# Patient Record
Sex: MALE | Race: WHITE | NOT HISPANIC OR LATINO | Employment: UNEMPLOYED | URBAN - METROPOLITAN AREA
[De-identification: names, ages, dates, MRNs, and addresses within clinical notes are randomized per-mention and may not be internally consistent; named-entity substitution may affect disease eponyms.]

---

## 2024-08-12 PROBLEM — I48.20 CHRONIC ATRIAL FIBRILLATION: Chronic | Status: ACTIVE | Noted: 2024-08-12

## 2024-08-12 PROBLEM — I10 PRIMARY HYPERTENSION: Chronic | Status: ACTIVE | Noted: 2024-08-12

## 2024-08-12 PROBLEM — Z72.0 TOBACCO ABUSE: Chronic | Status: ACTIVE | Noted: 2024-08-12

## 2024-08-12 PROBLEM — K21.9 GASTROESOPHAGEAL REFLUX DISEASE WITHOUT ESOPHAGITIS: Chronic | Status: ACTIVE | Noted: 2024-08-12

## 2024-08-14 RX ORDER — SODIUM, POTASSIUM,MAG SULFATES 17.5-3.13G
1 SOLUTION, RECONSTITUTED, ORAL ORAL DAILY
Qty: 1 KIT | Refills: 0 | Status: SHIPPED | OUTPATIENT
Start: 2024-08-14 | End: 2024-08-16

## 2024-08-29 ENCOUNTER — TELEPHONE (OUTPATIENT)
Dept: GASTROENTEROLOGY | Facility: CLINIC | Age: 54
End: 2024-08-29

## 2024-08-29 NOTE — TELEPHONE ENCOUNTER
I called the patient and cancelled his colonoscopy on 9/4/2024 because he needs cardiac clearance. He has appt with Tete Araiza on 9/3/2024 but me need to see his cardiologist to get that clearance.

## 2024-09-03 PROBLEM — R00.0 TACHYCARDIA: Chronic | Status: ACTIVE | Noted: 2024-09-03

## 2024-11-14 ENCOUNTER — OFFICE VISIT (OUTPATIENT)
Dept: CARDIOLOGY | Facility: CLINIC | Age: 54
End: 2024-11-14
Payer: MEDICAID

## 2024-11-14 VITALS
WEIGHT: 250.31 LBS | HEART RATE: 120 BPM | DIASTOLIC BLOOD PRESSURE: 98 MMHG | BODY MASS INDEX: 33.9 KG/M2 | OXYGEN SATURATION: 98 % | SYSTOLIC BLOOD PRESSURE: 152 MMHG | HEIGHT: 72 IN

## 2024-11-14 DIAGNOSIS — R25.2 BILATERAL LEG CRAMPS: ICD-10-CM

## 2024-11-14 DIAGNOSIS — I73.9 PERIPHERAL VASCULAR DISEASE OF LOWER EXTREMITY: ICD-10-CM

## 2024-11-14 DIAGNOSIS — E78.5 HYPERLIPIDEMIA, UNSPECIFIED HYPERLIPIDEMIA TYPE: ICD-10-CM

## 2024-11-14 DIAGNOSIS — I10 PRIMARY HYPERTENSION: Chronic | ICD-10-CM

## 2024-11-14 DIAGNOSIS — R60.0 BILATERAL LEG EDEMA: Primary | ICD-10-CM

## 2024-11-14 DIAGNOSIS — I48.20 CHRONIC ATRIAL FIBRILLATION: Chronic | ICD-10-CM

## 2024-11-14 DIAGNOSIS — R00.0 TACHYCARDIA: Chronic | ICD-10-CM

## 2024-11-14 DIAGNOSIS — I83.813 VARICOSE VEINS OF BOTH LOWER EXTREMITIES WITH PAIN: ICD-10-CM

## 2024-11-14 PROCEDURE — G2211 COMPLEX E/M VISIT ADD ON: HCPCS | Mod: S$PBB,,,

## 2024-11-14 PROCEDURE — 3008F BODY MASS INDEX DOCD: CPT | Mod: CPTII,,,

## 2024-11-14 PROCEDURE — 3077F SYST BP >= 140 MM HG: CPT | Mod: CPTII,,,

## 2024-11-14 PROCEDURE — 1160F RVW MEDS BY RX/DR IN RCRD: CPT | Mod: CPTII,,,

## 2024-11-14 PROCEDURE — 99204 OFFICE O/P NEW MOD 45 MIN: CPT | Mod: S$PBB,,,

## 2024-11-14 PROCEDURE — 1159F MED LIST DOCD IN RCRD: CPT | Mod: CPTII,,,

## 2024-11-14 PROCEDURE — 99215 OFFICE O/P EST HI 40 MIN: CPT | Mod: PBBFAC,PN

## 2024-11-14 PROCEDURE — 99999 PR PBB SHADOW E&M-EST. PATIENT-LVL V: CPT | Mod: PBBFAC,,,

## 2024-11-14 PROCEDURE — 4010F ACE/ARB THERAPY RXD/TAKEN: CPT | Mod: CPTII,,,

## 2024-11-14 PROCEDURE — 3080F DIAST BP >= 90 MM HG: CPT | Mod: CPTII,,,

## 2024-11-14 PROCEDURE — 3044F HG A1C LEVEL LT 7.0%: CPT | Mod: CPTII,,,

## 2024-11-14 RX ORDER — LOSARTAN POTASSIUM 50 MG/1
100 TABLET ORAL DAILY
Qty: 180 TABLET | Refills: 3 | Status: SHIPPED | OUTPATIENT
Start: 2024-11-14 | End: 2025-11-14

## 2024-11-14 RX ORDER — APIXABAN 5 MG/1
5 TABLET, FILM COATED ORAL 2 TIMES DAILY
COMMUNITY
Start: 2024-11-04

## 2024-11-14 RX ORDER — METOPROLOL SUCCINATE 50 MG/1
100 TABLET, EXTENDED RELEASE ORAL DAILY
Qty: 180 TABLET | Refills: 3 | Status: SHIPPED | OUTPATIENT
Start: 2024-11-14 | End: 2025-11-14

## 2024-11-14 RX ORDER — ATORVASTATIN CALCIUM 40 MG/1
40 TABLET, FILM COATED ORAL DAILY
Qty: 90 TABLET | Refills: 3 | Status: SHIPPED | OUTPATIENT
Start: 2024-11-14 | End: 2025-11-14

## 2024-11-14 NOTE — PROGRESS NOTES
[facility] - [facility] [address]  Cardiology Clinic Note      Chief Complaint  Chief Complaint   Patient presents with    Peripheral Vascular Disease    Numbness     Left thigh       HPI:  Mr. Kessler is a 54-year-old male with a past medical history of tobacco dependency, GERD, tachycardia, history of right lung pulmonary embolism 5/2023, chronic atrial fibrillation TTE with cardioversion 05/2023, hypertension; echo 09/2024 EF 60-65% with normal diastolic and systolic function normal CVP    Patient is new to me and here to establish care  Referred by PCP for concerns with varicose veins and numbness  Presents with bilateral varicose veins he reports has been causing some discomfort  He also complains bilateral lower extremity edema and leg cramps  He does endorse tobacco use of about a half a pack per day  He does have a history of a left lower extremity DVT and chronic cellulitis  Reports that he has never been seen by electrophysiology for chronic AFib  Denies chest pain, SOB, or difficulty breathing  Denies palpitations, lightheadedness, dizziness, or syncope/presyncope  Denies cough, LE edema, orthopnea, or PND  Denies falls or head injures  Denies easy bruising, hematochezia, or hemoptysis  Denies fever, chills, or NVD  Denies any recent travels or close contact with sick individuals  He is independent and active without any debilities    EKG normal sinus rhythm heart rate 80s    Medications  Current Outpatient Medications   Medication Sig Dispense Refill    ELIQUIS 5 mg Tab Take 5 mg by mouth 2 (two) times daily.      pantoprazole (PROTONIX) 40 MG tablet Take 1 tablet (40 mg total) by mouth once daily. 90 tablet 3    albuterol (PROAIR HFA) 90 mcg/actuation inhaler Inhale 2 puffs into the lungs every 6 (six) hours as needed for Wheezing. Rescue 18 g 0    ammonium lactate (LAC-HYDRIN) 12 % lotion Apply topically 2 (two) times daily. 396 g 3    atorvastatin (LIPITOR) 40 MG tablet Take 1 tablet (40 mg total) by  mouth once daily. 90 tablet 3    losartan (COZAAR) 50 MG tablet Take 2 tablets (100 mg total) by mouth once daily. 180 tablet 3    metoprolol succinate (TOPROL-XL) 50 MG 24 hr tablet Take 2 tablets (100 mg total) by mouth once daily. 180 tablet 3     No current facility-administered medications for this visit.        History  Past Medical History:   Diagnosis Date    Blood clot in vein     In left leg, had it removed    Hypertension     Paroxysmal atrial fibrillation      Past Surgical History:   Procedure Laterality Date    FACIAL RECONSTRUCTION SURGERY      NASAL FRACTURE SURGERY       Social History     Socioeconomic History    Marital status: Single   Tobacco Use    Smoking status: Every Day     Types: Cigarettes    Smokeless tobacco: Never   Substance and Sexual Activity    Alcohol use: Not Currently    Drug use: Yes     Types: Other-see comments     Comment: On occasion     Social Drivers of Health     Financial Resource Strain: Low Risk  (9/3/2024)    Overall Financial Resource Strain (CARDIA)     Difficulty of Paying Living Expenses: Not very hard   Food Insecurity: Food Insecurity Present (9/3/2024)    Hunger Vital Sign     Worried About Running Out of Food in the Last Year: Sometimes true     Ran Out of Food in the Last Year: Sometimes true   Physical Activity: Inactive (9/3/2024)    Exercise Vital Sign     Days of Exercise per Week: 0 days     Minutes of Exercise per Session: 0 min   Stress: No Stress Concern Present (9/3/2024)    [employer] [place] [employer] - Occupational Stress Questionnaire     Feeling of Stress : Only a little   Housing Stability: High Risk (9/3/2024)    Housing Stability Vital Sign     Unable to Pay for Housing in the Last Year: Yes     Family History   Problem Relation Name Age of Onset    Cancer Mother          Allergies  Review of patient's allergies indicates:  No Known Allergies    Review of Systems   Review of Systems   Constitutional: Negative for chills,  decreased appetite, diaphoresis, fever, malaise/fatigue, weight gain and weight loss.   Eyes:  Negative for blurred vision.   Cardiovascular:  Positive for claudication and leg swelling. Negative for chest pain, dyspnea on exertion, irregular heartbeat, near-syncope, orthopnea, palpitations, paroxysmal nocturnal dyspnea and syncope.   Respiratory:  Negative for cough, shortness of breath, snoring, sputum production and wheezing.    Endocrine: Negative for cold intolerance, heat intolerance, polydipsia, polyphagia and polyuria.   Skin:  Negative for color change, dry skin, itching, nail changes and poor wound healing.   Musculoskeletal:  Negative for back pain, gout, joint pain and joint swelling.   Gastrointestinal:  Negative for bloating, abdominal pain, constipation, diarrhea, hematemesis, hematochezia, melena, nausea and vomiting.   Genitourinary:  Negative for dysuria and hematuria.   Neurological:  Negative for dizziness, headaches, light-headedness, numbness, paresthesias and weakness.   Psychiatric/Behavioral:  Negative for altered mental status, depression and memory loss.        Physical Exam  Vitals:    11/14/24 1312   BP: (!) 152/98   Pulse: (!) 120     Wt Readings from Last 1 Encounters:   11/14/24 113.6 kg (250 lb 5.3 oz)     Physical Exam  HENT:      Head: Normocephalic and atraumatic.      Mouth/Throat:      Mouth: Mucous membranes are moist.   Eyes:      Extraocular Movements: Extraocular movements intact.      Pupils: Pupils are equal, round, and reactive to light.   Neck:      Vascular: No carotid bruit.   Cardiovascular:      Heart sounds: No murmur heard.     No friction rub. No gallop.   Pulmonary:      Effort: Pulmonary effort is normal. No respiratory distress.   Abdominal:      General: Abdomen is flat.      Palpations: Abdomen is soft.   Musculoskeletal:      Right lower leg: Edema present.   Skin:     General: Skin is warm.      Capillary Refill: Capillary refill takes less than 2 seconds.    Neurological:      General: No focal deficit present.      Mental Status: He is alert.   Psychiatric:         Mood and Affect: Mood normal.       Labs  Hospital Outpatient Visit on 09/06/2024   Component Date Value Ref Range Status    BSA 09/06/2024 2.42  m2 Final    LVOT stroke volume 09/06/2024 75.73  cm3 Final    LVIDd 09/06/2024 3.37 (A)  3.5 - 6.0 cm Final    LV Systolic Volume 09/06/2024 12.73  mL Final    LV Systolic Volume Index 09/06/2024 5.4  mL/m2 Final    LVIDs 09/06/2024 2.00 (A)  2.1 - 4.0 cm Final    LV Diastolic Volume 09/06/2024 46.36  mL Final    LV Diastolic Volume Index 09/06/2024 19.64  mL/m2 Final    Left Ventricular End Systolic Volu* 09/06/2024 12.73  mL Final    Left Ventricular End Diastolic Vol* 09/06/2024 46.36  mL Final    IVS 09/06/2024 1.30 (A)  0.6 - 1.1 cm Final    LVOT diameter 09/06/2024 1.93  cm Final    LVOT area 09/06/2024 2.9  cm2 Final    FS 09/06/2024 41  28 - 44 % Final    Left Ventricle Relative Wall Thick* 09/06/2024 0.66  cm Final    PW 09/06/2024 1.12 (A)  0.6 - 1.1 cm Final    LV mass 09/06/2024 130.25  g Final    LV Mass Index 09/06/2024 55  g/m2 Final    MV Peak E Daniel 09/06/2024 0.68  m/s Final    TDI LATERAL 09/06/2024 0.09  m/s Final    TDI SEPTAL 09/06/2024 0.08  m/s Final    E/E' ratio 09/06/2024 8.00  m/s Final    MV Peak A Daniel 09/06/2024 0.84  m/s Final    E/A ratio 09/06/2024 0.81   Final    IVRT 09/06/2024 99.90  msec Final    E wave deceleration time 09/06/2024 144.74  msec Final    LV SEPTAL E/E' RATIO 09/06/2024 8.50  m/s Final    LV LATERAL E/E' RATIO 09/06/2024 7.56  m/s Final    LVOT peak daniel 09/06/2024 1.20  m/s Final    Left Ventricular Outflow Tract Iqra* 09/06/2024 1.01  cm/s Final    Left Ventricular Outflow Tract Iqra* 09/06/2024 4.11  mmHg Final    RV/LV Ratio 09/06/2024 0.99  cm Final    LA size 09/06/2024 3.39  cm Final    Left Atrium Minor Axis 09/06/2024 4.69  cm Final    Left Atrium Major Axis 09/06/2024 5.60  cm Final    RA Major Axis  09/06/2024 4.62  cm Final    AV mean gradient 09/06/2024 4  mmHg Final    AV peak gradient 09/06/2024 9  mmHg Final    Ao peak edward 09/06/2024 1.46  m/s Final    Ao VTI 09/06/2024 28.30  cm Final    LVOT peak VTI 09/06/2024 25.90  cm Final    AV valve area 09/06/2024 2.68  cm² Final    AV Velocity Ratio 09/06/2024 0.82   Final    AV index (prosthetic) 09/06/2024 0.92   Final    RAVEN by Velocity Ratio 09/06/2024 2.40  cm² Final    MV mean gradient 09/06/2024 1  mmHg Final    MV peak gradient 09/06/2024 2  mmHg Final    MV stenosis pressure 1/2 time 09/06/2024 41.97  ms Final    MV valve area p 1/2 method 09/06/2024 5.24  cm2 Final    MV valve area by continuity eq 09/06/2024 4.51  cm2 Final    MV VTI 09/06/2024 16.8  cm Final    PV PEAK VELOCITY 09/06/2024 1.07  m/s Final    PV peak gradient 09/06/2024 5  mmHg Final    Pulmonary Valve Mean Velocity 09/06/2024 0.75  m/s Final    Ao root annulus 09/06/2024 3.99  cm Final    STJ 09/06/2024 2.88  cm Final    Ascending aorta 09/06/2024 3.04  cm Final    IVC diameter 09/06/2024 1.78  cm Final    Mean e' 09/06/2024 0.09  m/s Final    ZLVIDS 09/06/2024 -8.72   Final    ZLVIDD 09/06/2024 -10.91   Final    RVDD 09/06/2024 3.34  cm Final    AORTIC VALVE CUSP SEPERATION 09/06/2024 2.08  cm Final    ANUPAM 09/06/2024 19.3  mL/m2 Final    LA Vol 09/06/2024 45.60  cm3 Final    LA Vol (MOD) 09/06/2024 34.00  cm3 Final    LA WIDTH 09/06/2024 3.1  cm Final    ANUPAM (MOD) 09/06/2024 14.4  mL/m2 Final    RA Width 09/06/2024 2.9  cm Final    Est. RA pres 09/06/2024 3  mmHg Final   Hospital Outpatient Visit on 09/06/2024   Component Date Value Ref Range Status    QRS Duration 09/06/2024 86  ms Final    [facility] QTC Calculation 09/06/2024 427  ms Final   Lab Visit on 08/29/2024   Component Date Value Ref Range Status    ALMA Screen 08/29/2024 Negative <1:80  Negative <1:80 Final    ALMA test was performed by Immunofluorescence on HEP2 cells.       Sodium 08/29/2024 141  136 - 145 mmol/L Final     Potassium 08/29/2024 4.2  3.5 - 5.1 mmol/L Final    Chloride 08/29/2024 106  95 - 110 mmol/L Final    CO2 08/29/2024 25  23 - 29 mmol/L Final    Glucose 08/29/2024 103  70 - 110 mg/dL Final    BUN 08/29/2024 13  6 - 20 mg/dL Final    Creatinine 08/29/2024 1.1  0.5 - 1.4 mg/dL Final    Calcium 08/29/2024 9.2  8.7 - 10.5 mg/dL Final    Total Protein 08/29/2024 7.5  6.0 - 8.4 g/dL Final    Albumin 08/29/2024 3.9  3.5 - 5.2 g/dL Final    Total Bilirubin 08/29/2024 0.4  0.1 - 1.0 mg/dL Final    Comment: For infants and newborns, interpretation of results should be based  on gestational age, weight and in agreement with clinical  observations.    Premature Infant recommended reference ranges:  Up to 24 hours.............<8.0 mg/dL  Up to 48 hours............<12.0 mg/dL  3-5 days..................<15.0 mg/dL  6-29 days.................<15.0 mg/dL      Alkaline Phosphatase 08/29/2024 132  55 - 135 U/L Final    AST 08/29/2024 17  10 - 40 U/L Final    ALT 08/29/2024 21  10 - 44 U/L Final    eGFR 08/29/2024 >60.0  >60 mL/min/1.73 m^2 Final    Anion Gap 08/29/2024 10  8 - 16 mmol/L Final    WBC 08/29/2024 7.27  3.90 - 12.70 K/uL Final    RBC 08/29/2024 4.65  4.60 - 6.20 M/uL Final    Hemoglobin 08/29/2024 14.7  14.0 - 18.0 g/dL Final    Hematocrit 08/29/2024 43.8  40.0 - 54.0 % Final    MCV 08/29/2024 94  82 - 98 fL Final    MCH 08/29/2024 31.6 (H)  27.0 - 31.0 pg Final    MCHC 08/29/2024 33.6  32.0 - 36.0 g/dL Final    RDW 08/29/2024 11.4 (L)  11.5 - 14.5 % Final    Platelets 08/29/2024 232  150 - 450 K/uL Final    MPV 08/29/2024 8.9 (L)  9.2 - 12.9 fL Final    Immature Granulocytes 08/29/2024 0.6 (H)  0.0 - 0.5 % Final    Gran # (ANC) 08/29/2024 4.5  1.8 - 7.7 K/uL Final    Immature Grans (Abs) 08/29/2024 0.04  0.00 - 0.04 K/uL Final    Comment: Mild elevation in immature granulocytes is non specific and   can be seen in a variety of conditions including stress response,   acute inflammation, trauma and pregnancy. Correlation  with other   laboratory and clinical findings is essential.      Lymph # 08/29/2024 1.8  1.0 - 4.8 K/uL Final    Mono # 08/29/2024 0.7  0.3 - 1.0 K/uL Final    Eos # 08/29/2024 0.2  0.0 - 0.5 K/uL Final    Baso # 08/29/2024 0.07  0.00 - 0.20 K/uL Final    nRBC 08/29/2024 0  0 /100 WBC Final    Gran % 08/29/2024 61.5  38.0 - 73.0 % Final    Lymph % 08/29/2024 25.2  18.0 - 48.0 % Final    Mono % 08/29/2024 9.5  4.0 - 15.0 % Final    Eosinophil % 08/29/2024 2.2  0.0 - 8.0 % Final    Basophil % 08/29/2024 1.0  0.0 - 1.9 % Final    Differential Method 08/29/2024 Automated   Final    CRP 08/29/2024 10.5 (H)  0.0 - 8.2 mg/L Final    Hemoglobin A1C 08/29/2024 5.3  4.0 - 5.6 % Final    Comment: ADA Screening Guidelines:  5.7-6.4%  Consistent with prediabetes  >or=6.5%  Consistent with diabetes    High levels of fetal hemoglobin interfere with the HbA1C  assay. Heterozygous hemoglobin variants (HbS, HgC, etc)do  not significantly interfere with this assay.   However, presence of multiple variants may affect accuracy.      Estimated Avg Glucose 08/29/2024 105  68 - 131 mg/dL Final    Hepatitis B Surface Ag 08/29/2024 Non-reactive  Non-reactive Final    Hep B C IgM 08/29/2024 Non-reactive  Non-reactive Final    Hep A IgM 08/29/2024 Non-reactive  Non-reactive Final    Hepatitis C Ab 08/29/2024 Non-reactive  Non-reactive Final    Cholesterol 08/29/2024 180  120 - 199 mg/dL Final    Comment: The National Cholesterol Education Program (NCEP) has set the  following guidelines (reference ranges) for Cholesterol:  Optimal.....................<200 mg/dL  Borderline High.............200-239 mg/dL  High........................> or = 240 mg/dL      Triglycerides 08/29/2024 117  30 - 150 mg/dL Final    Comment: The National Cholesterol Education Program (NCEP) has set the  following guidelines (reference values) for triglycerides:  Normal......................<150 mg/dL  Borderline High.............150-199  mg/dL  High........................200-499 mg/dL      HDL 08/29/2024 39 (L)  40 - 75 mg/dL Final    Comment: The National Cholesterol Education Program (NCEP) has set the  following guidelines (reference values) for HDL Cholesterol:  Low...............<40 mg/dL  Optimal...........>60 mg/dL      LDL Cholesterol 08/29/2024 117.6  63.0 - 159.0 mg/dL Final    Comment: The National Cholesterol Education Program (NCEP) has set the  following guidelines (reference values) for LDL Cholesterol:  Optimal.......................<130 mg/dL  Borderline High...............130-159 mg/dL  High..........................160-189 mg/dL  Very High.....................>190 mg/dL      HDL/Cholesterol Ratio 08/29/2024 21.7  20.0 - 50.0 % Final    Total Cholesterol/HDL Ratio 08/29/2024 4.6  2.0 - 5.0 Final    Non-HDL Cholesterol 08/29/2024 141  mg/dL Final    Comment: Risk category and Non-HDL cholesterol goals:  Coronary heart disease (CHD)or equivalent (10-year risk of CHD >20%):  Non-HDL cholesterol goal     <130 mg/dL  Two or more CHD risk factors and 10-year risk of CHD <= 20%:  Non-HDL cholesterol goal     <160 mg/dL  0 to 1 CHD risk factor:  Non-HDL cholesterol goal     <190 mg/dL      HIV 1/2 Ag/Ab 08/29/2024 Non-reactive  Non-reactive Final    PSA, Screen 08/29/2024 2.3  0.00 - 4.00 ng/mL Final    Comment: The testing method is a chemiluminescent microparticle immunoassay   manufactured by [employer] and performed on the [employer]   or   Ku system. Values obtained with different assay manufacturers   for   methods may be different and cannot be used interchangeably.  PSA Expected levels:  Hormonal Therapy: <0.05 ng/ml  Prostatectomy: <0.01 ng/ml  Radiation Therapy: <1.00 ng/ml      Vit D, 25-Hydroxy 08/29/2024 27 (L)  30 - 96 ng/mL Final    Comment: Vitamin D deficiency.........<10 ng/mL                              Vitamin D insufficiency......10-29 ng/mL       Vitamin D sufficiency........> or equal to 30  ng/mL  Vitamin D toxicity............>100 ng/mL      Vitamin B-12 08/29/2024 590  210 - 950 pg/mL Final    Uric Acid 08/29/2024 6.4  3.4 - 7.0 mg/dL Final    TSH 08/29/2024 1.857  0.400 - 4.000 uIU/mL Final    Testosterone, Total 08/29/2024 293 (L)  304 - 1227 ng/dL Final    Free T4 08/29/2024 0.87  0.71 - 1.51 ng/dL Final    Rheumatoid Factor 08/29/2024 <13.0  0.0 - 15.0 IU/mL Final    Sed Rate 08/29/2024 17 (H)  0 - 10 mm/Hr Final   Admission on 08/04/2024, Discharged on 08/04/2024   Component Date Value Ref Range Status    Sodium 08/04/2024 139  136 - 145 mmol/L Final    Potassium 08/04/2024 4.1  3.5 - 5.1 mmol/L Final    Chloride 08/04/2024 108  95 - 110 mmol/L Final    CO2 08/04/2024 22 (L)  23 - 29 mmol/L Final    Glucose 08/04/2024 99  70 - 110 mg/dL Final    BUN 08/04/2024 15  6 - 20 mg/dL Final    Creatinine 08/04/2024 1.2  0.5 - 1.4 mg/dL Final    Calcium 08/04/2024 8.7  8.7 - 10.5 mg/dL Final    Total Protein 08/04/2024 7.1  6.0 - 8.4 g/dL Final    Albumin 08/04/2024 3.7  3.5 - 5.2 g/dL Final    Total Bilirubin 08/04/2024 0.3  0.1 - 1.0 mg/dL Final    Comment: For infants and newborns, interpretation of results should be based  on gestational age, weight and in agreement with clinical  observations.    Premature Infant recommended reference ranges:  Up to 24 hours.............<8.0 mg/dL  Up to 48 hours............<12.0 mg/dL  3-5 days..................<15.0 mg/dL  6-29 days.................<15.0 mg/dL      Alkaline Phosphatase 08/04/2024 131  55 - 135 U/L Final    AST 08/04/2024 15  10 - 40 U/L Final    ALT 08/04/2024 19  10 - 44 U/L Final    eGFR 08/04/2024 >60.0  >60 mL/min/1.73 m^2 Final    Anion Gap 08/04/2024 9  8 - 16 mmol/L Final    Troponin I 08/04/2024 <0.006  0.000 - 0.026 ng/mL Final    Comment: The reference interval for Troponin I represents the 99th percentile   cutoff   for our facility and is consistent with 3rd generation assay   performance.      BNP 08/04/2024 11  0 - 99 pg/mL Final     Values of less than 100 pg/ml are consistent with non-CHF populations.    D-Dimer 08/04/2024 1.51 (H)  <0.50 mg/L FEU Final    Comment: The quantitative D-dimer assay should be used as an aid in   the diagnosis of deep vein thrombosis and pulmonary embolism  in patients with the appropriate presentation and clinical  history. The upper limit of the reference interval and the clinical   cut off   point are identical. Causes of a positive (>0.50 mg/L FEU) D-Dimer   test  include, but are not limited to: DVT, PE, DIC, thrombolytic   therapy, anticoagulant therapy, recent surgery, trauma, or   pregnancy, disseminated malignancy, aortic aneurysm, cirrhosis,  and severe infection. False negative results may occur in   patients with distal DVT.  ABELINO^612^^16^  LOT^610^DDiSup^[id number]\DDiBuf^[id number]\DDiReag^[id number]      QRS Duration 08/04/2024 84  ms Final    [facility] QTC Calculation 08/04/2024 428  ms Final    WBC 08/04/2024 8.82  3.90 - 12.70 K/uL Final    RBC 08/04/2024 4.27 (L)  4.60 - 6.20 M/uL Final    Hemoglobin 08/04/2024 13.4 (L)  14.0 - 18.0 g/dL Final    Hematocrit 08/04/2024 40.7  40.0 - 54.0 % Final    MCV 08/04/2024 95  82 - 98 fL Final    MCH 08/04/2024 31.4 (H)  27.0 - 31.0 pg Final    MCHC 08/04/2024 32.9  32.0 - 36.0 g/dL Final    RDW 08/04/2024 11.7  11.5 - 14.5 % Final    Platelets 08/04/2024 223  150 - 450 K/uL Final    MPV 08/04/2024 9.1 (L)  9.2 - 12.9 fL Final    Immature Granulocytes 08/04/2024 0.3  0.0 - 0.5 % Final    Gran # (ANC) 08/04/2024 5.3  1.8 - 7.7 K/uL Final    Immature Grans (Abs) 08/04/2024 0.03  0.00 - 0.04 K/uL Final    Comment: Mild elevation in immature granulocytes is non specific and   can be seen in a variety of conditions including stress response,   acute inflammation, trauma and pregnancy. Correlation with other   laboratory and clinical findings is essential.      Lymph # 08/04/2024 2.3  1.0 - 4.8 K/uL Final    Mono # 08/04/2024 0.9  0.3 - 1.0 K/uL Final    Eos # 08/04/2024 0.2  0.0 -  0.5 K/uL Final    Baso # 08/04/2024 0.06  0.00 - 0.20 K/uL Final    nRBC 08/04/2024 0  0 /100 WBC Final    Gran % 08/04/2024 59.5  38.0 - 73.0 % Final    Lymph % 08/04/2024 26.3  18.0 - 48.0 % Final    Mono % 08/04/2024 10.7  4.0 - 15.0 % Final    Eosinophil % 08/04/2024 2.5  0.0 - 8.0 % Final    Basophil % 08/04/2024 0.7  0.0 - 1.9 % Final    Differential Method 08/04/2024 Automated   Final    CRP 08/04/2024 7.3  0.0 - 8.2 mg/L Final       EKG  Reviewed    Echo   Results for orders placed or performed during the hospital encounter of 09/06/24   Echo   Result Value Ref Range    BSA 2.42 m2    LVOT stroke volume 75.73 cm3    LVIDd 3.37 (A) 3.5 - 6.0 cm    LV Systolic Volume 12.73 mL    LV Systolic Volume Index 5.4 mL/m2    LVIDs 2.00 (A) 2.1 - 4.0 cm    LV Diastolic Volume 46.36 mL    LV Diastolic Volume Index 19.64 mL/m2    Left Ventricular End Systolic Volume by Teichholz Method 12.73 mL    Left Ventricular End Diastolic Volume by Teichholz Method 46.36 mL    IVS 1.30 (A) 0.6 - 1.1 cm    LVOT diameter 1.93 cm    LVOT area 2.9 cm2    FS 41 28 - 44 %    Left Ventricle Relative Wall Thickness 0.66 cm    PW 1.12 (A) 0.6 - 1.1 cm    LV mass 130.25 g    LV Mass Index 55 g/m2    MV Peak E Daniel 0.68 m/s    TDI LATERAL 0.09 m/s    TDI SEPTAL 0.08 m/s    E/E' ratio 8.00 m/s    MV Peak A Daniel 0.84 m/s    E/A ratio 0.81     IVRT 99.90 msec    E wave deceleration time 144.74 msec    LV SEPTAL E/E' RATIO 8.50 m/s    LV LATERAL E/E' RATIO 7.56 m/s    LVOT peak daniel 1.20 m/s    Left Ventricular Outflow Tract Mean Velocity 1.01 cm/s    Left Ventricular Outflow Tract Mean Gradient 4.11 mmHg    RV/LV Ratio 0.99 cm    LA size 3.39 cm    Left Atrium Minor Axis 4.69 cm    Left Atrium Major Axis 5.60 cm    RA Major Axis 4.62 cm    AV mean gradient 4 mmHg    AV peak gradient 9 mmHg    Ao peak daniel 1.46 m/s    Ao VTI 28.30 cm    LVOT peak VTI 25.90 cm    AV valve area 2.68 cm²    AV Velocity Ratio 0.82     AV index (prosthetic) 0.92     RAVEN by  Velocity Ratio 2.40 cm²    MV mean gradient 1 mmHg    MV peak gradient 2 mmHg    MV stenosis pressure 1/2 time 41.97 ms    MV valve area p 1/2 method 5.24 cm2    MV valve area by continuity eq 4.51 cm2    MV VTI 16.8 cm    PV PEAK VELOCITY 1.07 m/s    PV peak gradient 5 mmHg    Pulmonary Valve Mean Velocity 0.75 m/s    Ao root annulus 3.99 cm    STJ 2.88 cm    Ascending aorta 3.04 cm    IVC diameter 1.78 cm    Mean e' 0.09 m/s    ZLVIDS -8.72     ZLVIDD -10.91     RVDD 3.34 cm    AORTIC VALVE CUSP SEPERATION 2.08 cm    ANUPAM 19.3 mL/m2    LA Vol 45.60 cm3    LA Vol (MOD) 34.00 cm3    LA WIDTH 3.1 cm    ANUPAM (MOD) 14.4 mL/m2    RA Width 2.9 cm    Est. RA pres 3 mmHg    Narrative      Left Ventricle: The left ventricle is normal in size. Mildly increased   wall thickness. Unable to assess wall motion. There is normal systolic   function with a visually estimated ejection fraction of 60 - 65%. There is   normal diastolic function.    Right Ventricle: Normal right ventricular cavity size. Systolic   function is normal.    IVC/SVC: Normal venous pressure at 3 mmHg.         Imaging  US Lower Extremity Venous Insufficiency Right    Result Date: 11/26/2024  EXAMINATION: US LOWER EXTREMITY VENOUS INSUFFICIENCY RIGHT CLINICAL HISTORY: Localized edema TECHNIQUE: Right lower extremity venous Doppler exam including Valsalva or standing maneuvers for evaluation of venous insufficiency of the right superficial system.  Reflux times greater than 500 ms were considered indicative of superficial system reflux. COMPARISON: None FINDINGS: Technique: Sonographic evaluation of the bilateral  lower extremity deep venous systems was performed. Grey scale, color flow and spectral doppler was performed. With the patient standing and supporting weight on contralateral leg insufficiency within the greater saphenous and lesser saphenous venous system was tested with augmentation via distal manual compression. Deep venous system: There is  evidence of flow, compressibility and augmentation within the bilateral common femoral, femoral, and popliteal veins.  Flow is seen within the bilateral peroneal  veins. Superficial venous system: Right leg: There is evidence of reflux lasting longer than 500 ms in in the right greater and lesser saphenous veins The maximal diameter within the right greater saphenous vein is 6 mm. The maximal diameter within the right lesser saphenous vein is 3 mm.     1. Hemodynamically significant venous reflux (reflux lasting greater than 500 ms) in the right greater and lesser saphenous veins. 2. No evidence of right lower extremity deep venous thrombosis. Electronically signed by: Henrry Guadarrama MD Date:    11/26/2024 Time:    09:24    US Lower Extremity Arteries Right    Result Date: 11/26/2024  EXAMINATION: US LOWER EXTREMITY ARTERIES RIGHT CLINICAL HISTORY: Cramp and spasm TECHNIQUE: Right lower extremity arterial duplex ultrasound examination performed. Multiple gray scale and color doppler images were obtained in addition to waveform analysis. COMPARISON: None. FINDINGS: The peak systolic velocities on the right are as follows, in centimeters/second: Common femoral artery: 74, triphasic Femoral artery, proximal: 85, triphasic Femoral artery, mid portion: 84, triphasic Femoral artery, distal: 67, triphasic Popliteal artery: Proximal 49, triphasic; distal 44, triphasic Anterior tibial artery: 56, triphasic Posterior tibial artery: 65, triphasic Dorsalis pedis artery: 57, triphasic     No hemodynamically significant stenosis demonstrated in the right lower extremity arterial system. Electronically signed by: Henrry Guadarrama MD Date:    11/26/2024 Time:    09:22      Prior coronary angiogram / intervention:  No priors    Assessment and Plan  Mr. Kessler is a 54-year-old male with a past medical history of tobacco dependency, GERD, tachycardia, history of right lung pulmonary embolism 5/2023, chronic atrial fibrillation TTE with  cardioversion 05/2023, hypertension; echo 09/2024 EF 60-65% with normal diastolic and systolic function normal CVP    Patient is new to me and here to establish care  Referred by PCP for concerns with varicose veins and numbness  Presents with bilateral varicose veins he reports has been causing some discomfort  He also complains bilateral lower extremity edema and leg cramps  He does endorse tobacco use of about a half a pack per day  He does have a history of a left lower extremity DVT and chronic cellulitis  Reports that he has never been seen by electrophysiology for chronic AFib  Denies chest pain, SOB, or difficulty breathing  Denies palpitations, lightheadedness, dizziness, or syncope/presyncope  Denies cough, LE edema, orthopnea, or PND  Denies falls or head injures  Denies easy bruising, hematochezia, or hemoptysis  Denies fever, chills, or NVD  Denies any recent travels or close contact with sick individuals  He is independent and active without any debilities    EKG normal sinus rhythm heart rate 80s    Do not see that patient is taking a statin  Will change Toprol to carvedilol 12.5 b.i.d. for better blood pressure and heart rate control  We will get an ultrasound of bilateral lower extremity veins and arteries; possible referral to vascular surgery considering results  Needs referral to electrophysiology for chronic atrial fibrillation    Peripheral vascular disease of lower extremity  Bilateral leg cramps  + claudication  Will get an ultrasound of bilateral lower extremity arteries  Can consider referral to vascular surgery pending results  Started on statin today  Continue anticoagulation  - Ambulatory referral/consult to Interventional Cardiology    Varicose veins of both lower extremities with pain  Bilateral leg edema (Primary)  Intermittent  We will get an ultrasound of bilateral lower extremity veins for insufficiency  Encouraged compression stockings and leg elevation while sitting or  lying    Atrial fibrillation  Chronic; most recent EKG shows normal sinus rhythm  Currently on Eliquis 5 mg b.i.d.  Continue beta-blocker  Referral to electrophysiology placed    Tachycardia  Heart rate 120s today  Increased beta-blocker for better heart rate and blood pressure control  - metoprolol succinate (TOPROL-XL) 50 MG 24 hr tablet; Take 2 tablets (100 mg total) by mouth once daily.  Dispense: 180 tablet; Refill: 3    Primary hypertension  Increase losartan to 100 mg  Return in 2 weeks for nurse visit for blood pressure and heart rate check  - losartan (COZAAR) 50 MG tablet; Take 2 tablets (100 mg total) by mouth once daily.  Dispense: 180 tablet; Refill: 3    Hyperlipidemia, unspecified hyperlipidemia type  Started on statin today  Repeat lipid panel in about 3-6 months  - atorvastatin (LIPITOR) 40 MG tablet; Take 1 tablet (40 mg total) by mouth once daily.  Dispense: 90 tablet; Refill: 3    Follow Up  Follow up in about 3 months (around 2/14/2025), or if symptoms worsen or fail to improve.       Brandi R. Carter, FNP-C [facility] - Cardiology    Total professional time spent for the encounter: 45 minutes  Time was spent preparing to see the patient, reviewing results of prior testing, obtaining and/or reviewing separately obtained history, performing a medically appropriate examination and interview, counseling and educating the patient/family, ordering medications/tests/procedures, referring and communicating with other health care professionals, documenting clinical information in the electronic health record, and independently interpreting results.

## 2024-11-25 ENCOUNTER — CLINICAL SUPPORT (OUTPATIENT)
Dept: CARDIOLOGY | Facility: CLINIC | Age: 54
End: 2024-11-25
Payer: MEDICAID

## 2024-11-25 VITALS — OXYGEN SATURATION: 99 % | SYSTOLIC BLOOD PRESSURE: 150 MMHG | DIASTOLIC BLOOD PRESSURE: 98 MMHG | HEART RATE: 86 BPM

## 2024-11-25 DIAGNOSIS — Z01.30 BLOOD PRESSURE CHECK: Primary | ICD-10-CM

## 2024-11-25 PROCEDURE — 99212 OFFICE O/P EST SF 10 MIN: CPT | Mod: PBBFAC,PN

## 2024-11-25 PROCEDURE — 99999 PR PBB SHADOW E&M-EST. PATIENT-LVL II: CPT | Mod: PBBFAC,,,

## 2024-11-25 NOTE — PROGRESS NOTES
2 week follow up BP check for ASHLEY Bay.  Patient is currently taking losartan 100 mg daily and metoprolol 100 mg daily.  Patient denies N/V, headaches, dizziness, blurred vision.  Patient reports forgetting to take medication this morning.  Advised patient to come back tomorrow morning for BP check.    ASHLEY Bay advised of visit via chart.  Advised patient, office will contact him with provider recommendations.  Patient verbalized understanding.

## 2024-11-27 ENCOUNTER — TELEPHONE (OUTPATIENT)
Dept: CARDIOLOGY | Facility: CLINIC | Age: 54
End: 2024-11-27
Payer: MEDICAID

## 2024-11-27 PROBLEM — I83.813 VARICOSE VEINS OF BOTH LOWER EXTREMITIES WITH PAIN: Status: ACTIVE | Noted: 2024-11-27

## 2024-11-27 PROBLEM — R25.2 BILATERAL LEG CRAMPS: Status: ACTIVE | Noted: 2024-11-27

## 2024-11-27 PROBLEM — I73.9 PERIPHERAL VASCULAR DISEASE OF LOWER EXTREMITY: Status: ACTIVE | Noted: 2024-11-27

## 2024-11-27 PROBLEM — R60.0 BILATERAL LEG EDEMA: Status: ACTIVE | Noted: 2024-11-27

## 2024-11-27 PROBLEM — E78.5 HYPERLIPIDEMIA: Status: ACTIVE | Noted: 2024-11-27
